# Patient Record
Sex: FEMALE | Race: WHITE | Employment: UNEMPLOYED | URBAN - METROPOLITAN AREA
[De-identification: names, ages, dates, MRNs, and addresses within clinical notes are randomized per-mention and may not be internally consistent; named-entity substitution may affect disease eponyms.]

---
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
## 2023-10-24 SDOH — HEALTH STABILITY: PHYSICAL HEALTH: ON AVERAGE, HOW MANY DAYS PER WEEK DO YOU ENGAGE IN MODERATE TO STRENUOUS EXERCISE (LIKE A BRISK WALK)?: 3 DAYS

## 2023-10-24 SDOH — HEALTH STABILITY: PHYSICAL HEALTH: ON AVERAGE, HOW MANY MINUTES DO YOU ENGAGE IN EXERCISE AT THIS LEVEL?: 10 MIN

## 2023-10-26 DIAGNOSIS — S83.004A DISLOCATION OF RIGHT PATELLA, INITIAL ENCOUNTER: Primary | ICD-10-CM

## 2023-10-27 ENCOUNTER — OFFICE VISIT (OUTPATIENT)
Dept: ORTHOPEDIC SURGERY | Age: 18
End: 2023-10-27

## 2023-10-27 VITALS — TEMPERATURE: 98 F | HEIGHT: 72 IN | WEIGHT: 250 LBS | BODY MASS INDEX: 33.86 KG/M2

## 2023-10-27 DIAGNOSIS — M23.51 RECURRENT RIGHT KNEE INSTABILITY: ICD-10-CM

## 2023-10-27 DIAGNOSIS — S83.206A TEAR OF MENISCUS OF RIGHT KNEE, UNSPECIFIED MENISCUS, UNSPECIFIED TEAR TYPE, UNSPECIFIED WHETHER OLD OR CURRENT TEAR: Primary | ICD-10-CM

## 2023-10-27 DIAGNOSIS — M25.361 PATELLAR INSTABILITY OF RIGHT KNEE: ICD-10-CM

## 2023-10-27 DIAGNOSIS — Z71.82 EXERCISE COUNSELING: ICD-10-CM

## 2023-10-27 DIAGNOSIS — Z78.9 ACTIVITY OF DAILY LIVING ALTERATION: ICD-10-CM

## 2023-10-27 NOTE — PROGRESS NOTES
Chief Complaint   Patient presents with    Knee Pain     Right Knee x 4 years, states of instability, knee popping out and going back in, most recent time was 1 month ago. No known recent injury, no previous knee surgery. HPI:    Patient is 19 y.o. female complaining chronic, atraumatic, insidious onset right knee pain for 4 years after patellar dislocation when she was younger. She admits to stiffness, deep, aching pain, swelling, difficulty with stairs and ambulating far distances. She admits to gross instability specifically in her right knee. Previous treatments include rest, ice, and anti-inflammatory medication and HEP without much relief. ROS:    Skin: (-) rash,(-) psoriasis,(-) eczema, (-)skin cancer. Neurologic: (-)numbness, (-)tingling, (-)headaches, (-) LOC. Cardiovascular: (-) Chest pain, (-) swelling in legs/feet, (-) SOB, (-) cramping in legs/feet with walking. All other review of systems negative except stated above or in HPI      No past medical history on file. No past surgical history on file. No current outpatient medications on file.   Allergies   Allergen Reactions    Penicillins Anaphylaxis and Nausea And Vomiting     Social History     Socioeconomic History    Marital status: Single     Spouse name: Not on file    Number of children: Not on file    Years of education: Not on file    Highest education level: Not on file   Occupational History    Not on file   Tobacco Use    Smoking status: Never    Smokeless tobacco: Not on file   Substance and Sexual Activity    Alcohol use: Not on file    Drug use: Not on file    Sexual activity: Not on file   Other Topics Concern    Not on file   Social History Narrative    Not on file     Social Determinants of Health     Financial Resource Strain: Not on file   Food Insecurity: Not on file   Transportation Needs: Not on file   Physical Activity: Insufficiently Active (10/24/2023)    Exercise Vital Sign     Days of Exercise per Week:

## 2023-11-08 ENCOUNTER — HOSPITAL ENCOUNTER (OUTPATIENT)
Dept: MRI IMAGING | Age: 18
Discharge: HOME | End: 2023-11-10
Attending: ORTHOPAEDIC SURGERY
Payer: COMMERCIAL

## 2023-11-08 DIAGNOSIS — S83.206A TEAR OF MENISCUS OF RIGHT KNEE, UNSPECIFIED MENISCUS, UNSPECIFIED TEAR TYPE, UNSPECIFIED WHETHER OLD OR CURRENT TEAR: ICD-10-CM

## 2023-11-08 PROCEDURE — 73721 MRI JNT OF LWR EXTRE W/O DYE: CPT

## 2023-11-18 NOTE — PROGRESS NOTES
visit.      Imaging:      MRI KNEE RIGHT WO CONTRAST    Result Date: 11/9/2023  EXAMINATION: MRI OF THE RIGHT KNEE WITHOUT CONTRAST, 11/8/2023 3:13 pm TECHNIQUE: Multiplanar multisequence MRI of the right knee was performed without the administration of intravenous contrast. COMPARISON: 3/24/2022 HISTORY: ORDERING SYSTEM PROVIDED HISTORY: Tear of meniscus of right knee, unspecified meniscus, unspecified tear type, unspecified whether old or current tear FINDINGS: MENISCI: There is some anterior extrusion of the anterior horn of the medial meniscus, which is unchanged from older exam on 3/24/2022. A small chronic tear is possible; however, this is favored to not communicate with the articular surface. Otherwise intact medial and lateral menisci. CRUCIATE LIGAMENTS: Intact anterior cruciate and posterior cruciate ligaments. EXTENSOR MECHANISM: Lateral patellar translation within a shallow trochlear groove. Tibial tuberosity to trochlear groove distance measures 23 mm, elevated. Grossly intact quadriceps and patellar tendons and patellar retinacula. LATERAL COLLATERAL LIGAMENT COMPLEX: Intact IT band, lateral collateral ligament proper, biceps femoris tendon and popliteus tendon. MEDIAL COLLATERAL LIGAMENT COMPLEX: The superficial and deep components of the medial collateral ligament are intact. KNEE JOINT: Small joint effusion. Intermediate grade chondromalacia at the patellar ridge. Small osteophytes at the patellofemoral compartment. Low-grade chondromalacia at the medial femoral condyle. BONE MARROW: No evidence of fracture. Normal marrow signal.     No evidence of acute internal derangement. Chronic anatomy associated with patellofemoral maltracking with associated patellofemoral joint osteoarthritis. This is similar in appearance to prior study on 3/24/2022.  Partial extrusion of the anterior horn of the medial meniscus is also unchanged from older exam.     XR KNEE RIGHT (MIN 4 VIEWS)    Result Date:

## 2023-11-21 ENCOUNTER — OFFICE VISIT (OUTPATIENT)
Dept: ORTHOPEDIC SURGERY | Age: 18
End: 2023-11-21
Payer: COMMERCIAL

## 2023-11-21 VITALS — TEMPERATURE: 98 F | WEIGHT: 250 LBS | BODY MASS INDEX: 33.86 KG/M2 | HEIGHT: 72 IN

## 2023-11-21 DIAGNOSIS — M25.361 PATELLAR INSTABILITY OF RIGHT KNEE: ICD-10-CM

## 2023-11-21 DIAGNOSIS — Z78.9 ACTIVITY OF DAILY LIVING ALTERATION: ICD-10-CM

## 2023-11-21 DIAGNOSIS — S83.004S DISLOCATION OF RIGHT PATELLA, SEQUELA: ICD-10-CM

## 2023-11-21 DIAGNOSIS — M23.51 RECURRENT RIGHT KNEE INSTABILITY: Primary | ICD-10-CM

## 2023-11-21 DIAGNOSIS — M17.11 OSTEOARTHRITIS OF RIGHT PATELLOFEMORAL JOINT: ICD-10-CM

## 2023-11-21 PROCEDURE — G8417 CALC BMI ABV UP PARAM F/U: HCPCS | Performed by: ORTHOPAEDIC SURGERY

## 2023-11-21 PROCEDURE — G8428 CUR MEDS NOT DOCUMENT: HCPCS | Performed by: ORTHOPAEDIC SURGERY

## 2023-11-21 PROCEDURE — 99214 OFFICE O/P EST MOD 30 MIN: CPT | Performed by: ORTHOPAEDIC SURGERY

## 2023-11-21 PROCEDURE — 1036F TOBACCO NON-USER: CPT | Performed by: ORTHOPAEDIC SURGERY

## 2023-11-21 PROCEDURE — G8484 FLU IMMUNIZE NO ADMIN: HCPCS | Performed by: ORTHOPAEDIC SURGERY
